# Patient Record
Sex: MALE | Race: WHITE | NOT HISPANIC OR LATINO
[De-identification: names, ages, dates, MRNs, and addresses within clinical notes are randomized per-mention and may not be internally consistent; named-entity substitution may affect disease eponyms.]

---

## 2020-07-01 ENCOUNTER — APPOINTMENT (OUTPATIENT)
Dept: UROLOGY | Facility: CLINIC | Age: 75
End: 2020-07-01
Payer: MEDICARE

## 2020-07-01 VITALS
HEIGHT: 65 IN | HEART RATE: 72 BPM | WEIGHT: 140 LBS | TEMPERATURE: 97.6 F | SYSTOLIC BLOOD PRESSURE: 130 MMHG | DIASTOLIC BLOOD PRESSURE: 75 MMHG | BODY MASS INDEX: 23.32 KG/M2 | OXYGEN SATURATION: 96 %

## 2020-07-01 DIAGNOSIS — R33.8 BENIGN PROSTATIC HYPERPLASIA WITH LOWER URINARY TRACT SYMPMS: ICD-10-CM

## 2020-07-01 DIAGNOSIS — N40.1 BENIGN PROSTATIC HYPERPLASIA WITH LOWER URINARY TRACT SYMPMS: ICD-10-CM

## 2020-07-01 PROCEDURE — 99204 OFFICE O/P NEW MOD 45 MIN: CPT | Mod: 25

## 2020-07-01 PROCEDURE — 52000 CYSTOURETHROSCOPY: CPT

## 2020-07-01 PROCEDURE — 51798 US URINE CAPACITY MEASURE: CPT

## 2020-07-02 LAB
APPEARANCE: CLEAR
BACTERIA: NEGATIVE
BILIRUBIN URINE: NEGATIVE
BLOOD URINE: NEGATIVE
COLOR: NORMAL
GLUCOSE QUALITATIVE U: NEGATIVE
HYALINE CASTS: 0 /LPF
KETONES URINE: NEGATIVE
LEUKOCYTE ESTERASE URINE: NEGATIVE
MICROSCOPIC-UA: NORMAL
NITRITE URINE: NEGATIVE
PH URINE: 7
PROTEIN URINE: NEGATIVE
RED BLOOD CELLS URINE: 1 /HPF
SPECIFIC GRAVITY URINE: 1.01
SQUAMOUS EPITHELIAL CELLS: 0 /HPF
UROBILINOGEN URINE: NORMAL
WHITE BLOOD CELLS URINE: 0 /HPF

## 2020-07-03 LAB — BACTERIA UR CULT: NORMAL

## 2020-07-03 NOTE — ASSESSMENT
[FreeTextEntry1] : 75yo male long standing history of BPH, recurrent AUR\par Very enlarged prostate, maybe 100gm on cystoscopy and exam\par Recommend US for approximate size to better  him on surgical options\par Could consider CIC if not interested in surgery \par Will contact him after US for further discussion

## 2020-07-03 NOTE — HISTORY OF PRESENT ILLNESS
[FreeTextEntry1] : This is a 75yo male with long standing history of BPH, several episodes of acute retention, here for second opinion. No PSA records or imaging available. Says PSA has been wnl. Currently on tamsulosin and finasteride. Last episode of retention was about 1 week ago. Currently voiding without Bryant. He has been instructed on CIC in the past and has catheters at home. He was told he might need a TURP. History of stent several years ago, on Plavix.  [Urinary Retention] : urinary retention [Urinary Urgency] : urinary urgency [Nocturia] : nocturia [Urinary Frequency] : urinary frequency [Dysuria] : no dysuria [Hematuria - Gross] : no gross hematuria [None] : None

## 2020-07-03 NOTE — PHYSICAL EXAM
[General Appearance - Well Developed] : well developed [Normal Appearance] : normal appearance [General Appearance - Well Nourished] : well nourished [Well Groomed] : well groomed [General Appearance - In No Acute Distress] : no acute distress [Edema] : no peripheral edema [Respiration, Rhythm And Depth] : normal respiratory rhythm and effort [Exaggerated Use Of Accessory Muscles For Inspiration] : no accessory muscle use [Abdomen Tenderness] : non-tender [Abdomen Soft] : soft [Costovertebral Angle Tenderness] : no ~M costovertebral angle tenderness [FreeTextEntry1] : Prostate very enlarged, approx 100gm, slightly firm bilaterally but no nodules [No Focal Deficits] : no focal deficits [Normal Station and Gait] : the gait and station were normal for the patient's age [] : no rash [Affect] : the affect was normal [Mood] : the mood was normal [Oriented To Time, Place, And Person] : oriented to person, place, and time [Not Anxious] : not anxious

## 2023-09-12 ENCOUNTER — APPOINTMENT (OUTPATIENT)
Dept: HEART AND VASCULAR | Facility: CLINIC | Age: 78
End: 2023-09-12
Payer: MEDICARE

## 2023-09-12 ENCOUNTER — NON-APPOINTMENT (OUTPATIENT)
Age: 78
End: 2023-09-12

## 2023-09-12 ENCOUNTER — LABORATORY RESULT (OUTPATIENT)
Age: 78
End: 2023-09-12

## 2023-09-12 VITALS
WEIGHT: 144 LBS | HEART RATE: 66 BPM | DIASTOLIC BLOOD PRESSURE: 70 MMHG | BODY MASS INDEX: 23.99 KG/M2 | SYSTOLIC BLOOD PRESSURE: 115 MMHG | HEIGHT: 65 IN

## 2023-09-12 DIAGNOSIS — I10 ESSENTIAL (PRIMARY) HYPERTENSION: ICD-10-CM

## 2023-09-12 DIAGNOSIS — R09.89 OTHER SPECIFIED SYMPTOMS AND SIGNS INVOLVING THE CIRCULATORY AND RESPIRATORY SYSTEMS: ICD-10-CM

## 2023-09-12 DIAGNOSIS — N52.9 MALE ERECTILE DYSFUNCTION, UNSPECIFIED: ICD-10-CM

## 2023-09-12 DIAGNOSIS — R01.1 CARDIAC MURMUR, UNSPECIFIED: ICD-10-CM

## 2023-09-12 DIAGNOSIS — I25.10 ATHEROSCLEROTIC HEART DISEASE OF NATIVE CORONARY ARTERY W/OUT ANGINA PECTORIS: ICD-10-CM

## 2023-09-12 PROCEDURE — 99204 OFFICE O/P NEW MOD 45 MIN: CPT

## 2023-09-12 PROCEDURE — ZZZZZ: CPT

## 2023-09-12 PROCEDURE — 36415 COLL VENOUS BLD VENIPUNCTURE: CPT

## 2023-09-12 PROCEDURE — 93880 EXTRACRANIAL BILAT STUDY: CPT

## 2023-09-12 PROCEDURE — 93000 ELECTROCARDIOGRAM COMPLETE: CPT

## 2023-09-12 PROCEDURE — 93306 TTE W/DOPPLER COMPLETE: CPT

## 2023-09-12 RX ORDER — SILDENAFIL 50 MG/1
50 TABLET ORAL
Qty: 6 | Refills: 3 | Status: ACTIVE | COMMUNITY
Start: 2023-09-12 | End: 1900-01-01

## 2023-09-12 RX ORDER — METOPROLOL SUCCINATE 25 MG/1
25 TABLET, EXTENDED RELEASE ORAL DAILY
Qty: 45 | Refills: 3 | Status: ACTIVE | COMMUNITY
Start: 2023-09-12

## 2023-09-12 RX ORDER — TAMSULOSIN HYDROCHLORIDE 0.4 MG/1
0.4 CAPSULE ORAL DAILY
Refills: 0 | Status: ACTIVE | COMMUNITY

## 2023-09-13 LAB
25(OH)D3 SERPL-MCNC: 32.6 NG/ML
ALBUMIN SERPL ELPH-MCNC: 4.6 G/DL
ALP BLD-CCNC: 110 U/L
ALT SERPL-CCNC: 20 U/L
ANION GAP SERPL CALC-SCNC: 10 MMOL/L
AST SERPL-CCNC: 24 U/L
BASOPHILS # BLD AUTO: 0.03 K/UL
BASOPHILS NFR BLD AUTO: 0.6 %
BILIRUB SERPL-MCNC: 1.7 MG/DL
BUN SERPL-MCNC: 12 MG/DL
CALCIUM SERPL-MCNC: 9.5 MG/DL
CHLORIDE SERPL-SCNC: 103 MMOL/L
CHOLEST SERPL-MCNC: 142 MG/DL
CO2 SERPL-SCNC: 27 MMOL/L
CREAT SERPL-MCNC: 0.81 MG/DL
EGFR: 90 ML/MIN/1.73M2
EOSINOPHIL # BLD AUTO: 0.15 K/UL
EOSINOPHIL NFR BLD AUTO: 3.2 %
ESTIMATED AVERAGE GLUCOSE: 91 MG/DL
FOLATE SERPL-MCNC: 14.6 NG/ML
GLUCOSE SERPL-MCNC: 95 MG/DL
HBA1C MFR BLD HPLC: 4.8 %
HCT VFR BLD CALC: 45.8 %
HDLC SERPL-MCNC: 71 MG/DL
HGB BLD-MCNC: 14.7 G/DL
IMM GRANULOCYTES NFR BLD AUTO: 0.4 %
LDLC SERPL CALC-MCNC: 59 MG/DL
LYMPHOCYTES # BLD AUTO: 1.02 K/UL
LYMPHOCYTES NFR BLD AUTO: 22 %
MAN DIFF?: NORMAL
MCHC RBC-ENTMCNC: 30.6 PG
MCHC RBC-ENTMCNC: 32.1 GM/DL
MCV RBC AUTO: 95.2 FL
MONOCYTES # BLD AUTO: 0.35 K/UL
MONOCYTES NFR BLD AUTO: 7.5 %
NEUTROPHILS # BLD AUTO: 3.07 K/UL
NEUTROPHILS NFR BLD AUTO: 66.3 %
NONHDLC SERPL-MCNC: 71 MG/DL
PLATELET # BLD AUTO: 167 K/UL
POTASSIUM SERPL-SCNC: 5.2 MMOL/L
PROT SERPL-MCNC: 7.2 G/DL
PSA FREE FLD-MCNC: 26 %
PSA FREE SERPL-MCNC: 0.95 NG/ML
PSA SERPL-MCNC: 3.68 NG/ML
RBC # BLD: 4.81 M/UL
RBC # FLD: 12.2 %
SODIUM SERPL-SCNC: 140 MMOL/L
T3 SERPL-MCNC: 110 NG/DL
T3FREE SERPL-MCNC: 2.86 PG/ML
T3RU NFR SERPL: 1 TBI
T4 FREE SERPL-MCNC: 1.3 NG/DL
T4 SERPL-MCNC: 7.3 UG/DL
TRIGL SERPL-MCNC: 53 MG/DL
TSH SERPL-ACNC: 1.81 UIU/ML
VIT B12 SERPL-MCNC: 768 PG/ML
WBC # FLD AUTO: 4.64 K/UL

## 2024-03-12 ENCOUNTER — APPOINTMENT (OUTPATIENT)
Dept: HEART AND VASCULAR | Facility: CLINIC | Age: 79
End: 2024-03-12

## 2024-03-19 ENCOUNTER — APPOINTMENT (OUTPATIENT)
Dept: HEART AND VASCULAR | Facility: CLINIC | Age: 79
End: 2024-03-19

## 2025-03-11 ENCOUNTER — APPOINTMENT (OUTPATIENT)
Dept: HEART AND VASCULAR | Facility: CLINIC | Age: 80
End: 2025-03-11

## 2025-07-01 ENCOUNTER — APPOINTMENT (OUTPATIENT)
Dept: HEART AND VASCULAR | Facility: CLINIC | Age: 80
End: 2025-07-01

## 2025-07-24 ENCOUNTER — APPOINTMENT (OUTPATIENT)
Dept: HEART AND VASCULAR | Facility: CLINIC | Age: 80
End: 2025-07-24

## 2025-07-24 ENCOUNTER — APPOINTMENT (OUTPATIENT)
Dept: HEART AND VASCULAR | Facility: CLINIC | Age: 80
End: 2025-07-24
Payer: MEDICARE

## 2025-07-24 ENCOUNTER — LABORATORY RESULT (OUTPATIENT)
Age: 80
End: 2025-07-24

## 2025-07-24 ENCOUNTER — NON-APPOINTMENT (OUTPATIENT)
Age: 80
End: 2025-07-24

## 2025-07-24 VITALS
SYSTOLIC BLOOD PRESSURE: 130 MMHG | HEART RATE: 67 BPM | DIASTOLIC BLOOD PRESSURE: 65 MMHG | BODY MASS INDEX: 23.66 KG/M2 | RESPIRATION RATE: 14 BRPM | HEIGHT: 65 IN | WEIGHT: 142 LBS

## 2025-07-24 DIAGNOSIS — I25.10 ATHEROSCLEROTIC HEART DISEASE OF NATIVE CORONARY ARTERY W/OUT ANGINA PECTORIS: ICD-10-CM

## 2025-07-24 DIAGNOSIS — I10 ESSENTIAL (PRIMARY) HYPERTENSION: ICD-10-CM

## 2025-07-24 DIAGNOSIS — I08.0 RHEUMATIC DISORDERS OF BOTH MITRAL AND AORTIC VALVES: ICD-10-CM

## 2025-07-24 DIAGNOSIS — I35.1 NONRHEUMATIC AORTIC (VALVE) INSUFFICIENCY: ICD-10-CM

## 2025-07-24 DIAGNOSIS — I65.23 OCCLUSION AND STENOSIS OF BILATERAL CAROTID ARTERIES: ICD-10-CM

## 2025-07-24 PROCEDURE — 99214 OFFICE O/P EST MOD 30 MIN: CPT

## 2025-07-24 PROCEDURE — G2211 COMPLEX E/M VISIT ADD ON: CPT

## 2025-07-24 PROCEDURE — 93000 ELECTROCARDIOGRAM COMPLETE: CPT

## 2025-07-24 PROCEDURE — 93306 TTE W/DOPPLER COMPLETE: CPT

## 2025-07-24 PROCEDURE — 93880 EXTRACRANIAL BILAT STUDY: CPT

## 2025-07-25 LAB
25(OH)D3 SERPL-MCNC: 27.5 NG/ML
ALBUMIN SERPL ELPH-MCNC: 4.2 G/DL
ALP BLD-CCNC: 99 U/L
ALT SERPL-CCNC: 20 U/L
ANION GAP SERPL CALC-SCNC: 12 MMOL/L
AST SERPL-CCNC: 31 U/L
BASOPHILS # BLD AUTO: 0.04 K/UL
BASOPHILS NFR BLD AUTO: 0.7 %
BILIRUB SERPL-MCNC: 1.3 MG/DL
BUN SERPL-MCNC: 17 MG/DL
CALCIUM SERPL-MCNC: 9.3 MG/DL
CHLORIDE SERPL-SCNC: 104 MMOL/L
CHOLEST SERPL-MCNC: 137 MG/DL
CO2 SERPL-SCNC: 24 MMOL/L
CREAT SERPL-MCNC: 0.85 MG/DL
EGFRCR SERPLBLD CKD-EPI 2021: 88 ML/MIN/1.73M2
EOSINOPHIL # BLD AUTO: 0.08 K/UL
EOSINOPHIL NFR BLD AUTO: 1.4 %
ESTIMATED AVERAGE GLUCOSE: 88 MG/DL
FOLATE SERPL-MCNC: 14.4 NG/ML
GLUCOSE SERPL-MCNC: 92 MG/DL
HBA1C MFR BLD HPLC: 4.7 %
HCT VFR BLD CALC: 43.9 %
HDLC SERPL-MCNC: 69 MG/DL
HGB BLD-MCNC: 14.2 G/DL
IMM GRANULOCYTES NFR BLD AUTO: 1.1 %
LDLC SERPL-MCNC: 56 MG/DL
LYMPHOCYTES # BLD AUTO: 1.13 K/UL
LYMPHOCYTES NFR BLD AUTO: 20.3 %
MAN DIFF?: NORMAL
MCHC RBC-ENTMCNC: 30.7 PG
MCHC RBC-ENTMCNC: 32.3 G/DL
MCV RBC AUTO: 95 FL
MONOCYTES # BLD AUTO: 0.36 K/UL
MONOCYTES NFR BLD AUTO: 6.5 %
NEUTROPHILS # BLD AUTO: 3.91 K/UL
NEUTROPHILS NFR BLD AUTO: 70 %
NONHDLC SERPL-MCNC: 67 MG/DL
PLATELET # BLD AUTO: 178 K/UL
POTASSIUM SERPL-SCNC: 4.3 MMOL/L
PROT SERPL-MCNC: 6.4 G/DL
RBC # BLD: 4.62 M/UL
RBC # FLD: 12.4 %
SODIUM SERPL-SCNC: 141 MMOL/L
T3 SERPL-MCNC: 116 NG/DL
T3FREE SERPL-MCNC: 2.82 PG/ML
T3RU NFR SERPL: 1 TBI
T4 FREE SERPL-MCNC: 1.3 NG/DL
T4 SERPL-MCNC: 7 UG/DL
TRIGL SERPL-MCNC: 47 MG/DL
TSH SERPL-ACNC: 1.49 UIU/ML
VIT B12 SERPL-MCNC: 630 PG/ML
WBC # FLD AUTO: 5.58 K/UL